# Patient Record
Sex: FEMALE | Race: WHITE | NOT HISPANIC OR LATINO
[De-identification: names, ages, dates, MRNs, and addresses within clinical notes are randomized per-mention and may not be internally consistent; named-entity substitution may affect disease eponyms.]

---

## 2017-01-03 ENCOUNTER — OTHER (OUTPATIENT)
Age: 72
End: 2017-01-03

## 2017-12-07 ENCOUNTER — APPOINTMENT (OUTPATIENT)
Dept: GYNECOLOGIC ONCOLOGY | Facility: CLINIC | Age: 72
End: 2017-12-07

## 2017-12-12 ENCOUNTER — APPOINTMENT (OUTPATIENT)
Dept: GYNECOLOGIC ONCOLOGY | Facility: CLINIC | Age: 72
End: 2017-12-12
Payer: MEDICARE

## 2017-12-12 VITALS
WEIGHT: 155 LBS | HEIGHT: 64 IN | DIASTOLIC BLOOD PRESSURE: 75 MMHG | BODY MASS INDEX: 26.46 KG/M2 | HEART RATE: 62 BPM | SYSTOLIC BLOOD PRESSURE: 114 MMHG

## 2017-12-12 PROCEDURE — 56820 COLPOSCOPY VULVA: CPT

## 2017-12-12 PROCEDURE — 99213 OFFICE O/P EST LOW 20 MIN: CPT | Mod: 25

## 2017-12-13 LAB — HPV HIGH+LOW RISK DNA PNL CVX: NOT DETECTED

## 2017-12-15 LAB — CYTOLOGY CVX/VAG DOC THIN PREP: NORMAL

## 2018-12-18 ENCOUNTER — APPOINTMENT (OUTPATIENT)
Dept: GYNECOLOGIC ONCOLOGY | Facility: CLINIC | Age: 73
End: 2018-12-18

## 2019-05-14 ENCOUNTER — APPOINTMENT (OUTPATIENT)
Dept: GYNECOLOGIC ONCOLOGY | Facility: CLINIC | Age: 74
End: 2019-05-14
Payer: MEDICARE

## 2019-05-14 PROCEDURE — 99213 OFFICE O/P EST LOW 20 MIN: CPT | Mod: 25

## 2019-05-14 PROCEDURE — 56820 COLPOSCOPY VULVA: CPT

## 2019-05-14 RX ORDER — CHROMIUM 200 MCG
TABLET ORAL
Refills: 0 | Status: DISCONTINUED | COMMUNITY
End: 2019-05-14

## 2019-05-14 RX ORDER — IPRATROPIUM BROMIDE AND ALBUTEROL SULFATE 2.5; .5 MG/3ML; MG/3ML
0.5-2.5 (3) SOLUTION RESPIRATORY (INHALATION)
Refills: 0 | Status: ACTIVE | COMMUNITY

## 2019-05-14 RX ORDER — BIOTIN 5 MG
5000 CAPSULE ORAL
Refills: 0 | Status: DISCONTINUED | COMMUNITY
End: 2019-05-14

## 2019-05-16 LAB — HPV HIGH+LOW RISK DNA PNL CVX: NOT DETECTED

## 2019-05-20 LAB — CYTOLOGY CVX/VAG DOC THIN PREP: NORMAL

## 2019-05-21 ENCOUNTER — CHART COPY (OUTPATIENT)
Age: 74
End: 2019-05-21

## 2019-06-06 ENCOUNTER — APPOINTMENT (OUTPATIENT)
Dept: ORTHOPEDIC SURGERY | Facility: CLINIC | Age: 74
End: 2019-06-06

## 2019-07-18 ENCOUNTER — APPOINTMENT (OUTPATIENT)
Dept: ORTHOPEDIC SURGERY | Facility: CLINIC | Age: 74
End: 2019-07-18
Payer: MEDICARE

## 2019-07-18 VITALS — DIASTOLIC BLOOD PRESSURE: 71 MMHG | SYSTOLIC BLOOD PRESSURE: 124 MMHG | HEART RATE: 54 BPM

## 2019-07-18 DIAGNOSIS — Z78.9 OTHER SPECIFIED HEALTH STATUS: ICD-10-CM

## 2019-07-18 DIAGNOSIS — Z60.2 PROBLEMS RELATED TO LIVING ALONE: ICD-10-CM

## 2019-07-18 DIAGNOSIS — M54.5 LOW BACK PAIN: ICD-10-CM

## 2019-07-18 DIAGNOSIS — Z82.61 FAMILY HISTORY OF ARTHRITIS: ICD-10-CM

## 2019-07-18 DIAGNOSIS — Z85.9 PERSONAL HISTORY OF MALIGNANT NEOPLASM, UNSPECIFIED: ICD-10-CM

## 2019-07-18 DIAGNOSIS — M25.551 PAIN IN RIGHT HIP: ICD-10-CM

## 2019-07-18 DIAGNOSIS — Z56.0 UNEMPLOYMENT, UNSPECIFIED: ICD-10-CM

## 2019-07-18 DIAGNOSIS — M47.816 SPONDYLOSIS W/OUT MYELOPATHY OR RADICULOPATHY, LUMBAR REGION: ICD-10-CM

## 2019-07-18 PROCEDURE — 99203 OFFICE O/P NEW LOW 30 MIN: CPT

## 2019-07-18 PROCEDURE — 72170 X-RAY EXAM OF PELVIS: CPT

## 2019-07-18 PROCEDURE — 72100 X-RAY EXAM L-S SPINE 2/3 VWS: CPT

## 2019-07-18 PROCEDURE — 73562 X-RAY EXAM OF KNEE 3: CPT | Mod: 50

## 2019-07-18 SDOH — ECONOMIC STABILITY - INCOME SECURITY: UNEMPLOYMENT, UNSPECIFIED: Z56.0

## 2019-07-18 SDOH — SOCIAL STABILITY - SOCIAL INSECURITY: PROBLEMS RELATED TO LIVING ALONE: Z60.2

## 2019-07-29 PROBLEM — M47.816 DJD (DEGENERATIVE JOINT DISEASE), LUMBAR: Status: ACTIVE | Noted: 2019-07-29

## 2019-07-29 NOTE — DISCUSSION/SUMMARY
[de-identified] : Bilateral Knee advanced degenerative joint disease with Valgus Deformity, Lumbar DJD \par The natural history and treatment of degenerative arthritis was discussed with the patient at length today. The spectrum of treatment including nonoperative modalities to surgical intervention was elucidated. Noninvasive and nonoperative treatment modalities include weight reduction, activity modification with low impact exercise,  as needed use of acetaminophen or anti-inflammatory medications if tolerated, glucosamine/chondroitin supplements, and physical therapy. Further treatments can include corticosteroid injection and the use of viscosupplementation with hyaluronic acid injections. Definitive surgical treatment can certainly include total joint arthroplasty also. The risks and benefits of each treatment options was discussed and all questions were answered.\par The patient was informed of the findings and recommended conservative management in the form of a home exercise program, activity modifications, stationary bicycling, swimming and weight loss program. A trial of Glucosamine and Chondroiten Sulphate was recommended.\par Patient states she does not require refills of her Mobic. \par A prescription for physical therapy was offered but patient opted for home exercise for now. \par Follow-up appointment was recommended 3 months.\par

## 2019-07-29 NOTE — HISTORY OF PRESENT ILLNESS
[2] : a current pain level of 2/10 [7] : an average pain level of 7/10 [de-identified] : Ms. CLAUDIA MEDINA is a 73 year old female, with pmhx of left knee hematoma requiring surgical evacuation 5-6 years ago, as well as left knee medial meniscectomy in 1996, presenting with bilateral knee pain for years. Patient localizes the pain of the bilateral knees to the medial and lateral aspect, and states it is worse with weightbearing activity. She has previously been treated by Dr Michel Gutierrez. Patient takes Mobic, sublingual CBD oil, and homeopathic HGH for pain, all of which work to reduce her symptoms at least temporarily. She had a gel series to the bilateral knees, last injection December 2018 which was not effective. She had a significant amount of swelling as a result of the last shot. Patient also admits to some lower back pain and right hip pain. JTM. \par

## 2019-07-29 NOTE — PHYSICAL EXAM
[Antalgic] : antalgic [LE] : Sensory: Intact in bilateral lower extremities [Knee] : patellar 2+ and symmetric bilaterally [Ankle] : ankle 2+ and symmetric bilaterally [DP] : dorsalis pedis 2+ and symmetric bilaterally [PT] : posterior tibial 2+ and symmetric bilaterally [de-identified] : On general examination the patient is adequately groomed and nourished. The vital parameters are as recorded. \par There is no lymphedema or diffuse swelling, no varicose veins, no skin warmth/erythema/scars/swelling, no ulcers and no palpable lymph nodes or masses in both lower extremities. Bilateral pedal pulses are well palpable.\par Upper Extremity:\par Both right and left upper extremities are unremarkable in terms of skin rash, lesions, pigmentation, redness, tenderness, swelling, joint instability, abnormal deformity or crepitus. The overall range of motion, sensation, motor tone and strength testing are normal.\par \par Bilateral Knee Exam: \par The gait is bilateral stiff knee antalgic.\par Knee alignment:            Right 5 degrees valgus with no flexion deformity. \par Left 5 degrees valgus with no flexion deformity.\par Both knees demonstrate no scars and the skin has no warmth, erythema, swelling or tenderness. \par Both knees have a range of motion of\par Extension:                    Right 0 degrees     Left 0 degrees\par Flexion:                                   Right 90 degrees      Left 95 degrees\par There is bilateral knee medial joint line tenderness. There is bilateral knee mild effusion. \par Claus's test is positive. Elvis test is positive.\par Lachman's test, Anterior/Posterior Drawer test and Pivot Shift Tests are negative. \par There is bilateral knee grade 1 MCL mediolateral laxity and no anteroposterior instability. \par Patella compression test is positive and patellofemoral tracking is normal with no lateral subluxation, apprehension or instability. \par Right knee quadriceps and hamstrings power is 4+.\par Left knee quadriceps and hamstrings power is 4+.\par \par Hip Exam:\par The gait and station is normal\par The patient has equal leg lengths and no pelvic tilt. Tristan/Lynne test is 7 inches on the right and 7 inches on the left. Active SLR is 60 degrees on the right and 60 degrees on the left. Both hips demonstrate no scars and the skin has no signs of inflammation or tenderness. \par Both Hips have a normal range of motion of flexion to 100 degrees, abduction 40 degrees, adduction 20 degrees, external rotation 40 degrees, internal rotation 20 degrees with symmetrical motion in flexion and extension. There is no flexion contracture, deformity or instability. Labral impingement tests are negative.\par Both hips flexor, abductor and extensor power is normal.\par \par Spine Exam:\par There is mild curvature of the spine with loss of normal lumbar lordosis. The skin is devoid of erythema, scars, pigmentation or rashes. There is mild lumbar spasm and tenderness especially at L4-L5 or L5-S1. \par The range of motion of the lumbar spine is limited by pain and spasm. Forward flexion is 80% normal, extension catch positive, lateral flexion and rotation 80% normal. There is no lumbar spine imbalance or instability detected.\par Bilateral passive SLR is right 40 degrees, left 40 degrees in supine and sitting positions. Lasegue's Test is negative.\par Neurology:\par The patient is alert and oriented in person, place and time. The mood is calm and affect is normal.\par Testing for coordination including Rhomberg's Test and Finger-Nose Test, sensation, motor tone and power and deep tendon reflexes in both lower extremities is normal.  [de-identified] : The following radiographs were ordered and read by me during this patients visit. I reviewed each radiograph in detail with the patient and discussed the findings as highlighted below. \par AP, lateral and skyline views of the bilateral knees confirm advanced degenerative joint disease with lateral joint space narrowing and osteophyte formation. Valgus deformity bilaterally. \par AP and false profile views of the right hip and AP view of the pelvis are within normal limits. \par AP and lateral views of the lumbar spine reveal DJD with loss of normal lordosis\par

## 2019-10-02 PROBLEM — Z60.2 PERSON LIVING ALONE: Status: ACTIVE | Noted: 2019-07-18

## 2020-10-06 ENCOUNTER — APPOINTMENT (OUTPATIENT)
Dept: GYNECOLOGIC ONCOLOGY | Facility: CLINIC | Age: 75
End: 2020-10-06
Payer: MEDICARE

## 2020-10-06 VITALS
DIASTOLIC BLOOD PRESSURE: 80 MMHG | BODY MASS INDEX: 22.53 KG/M2 | HEIGHT: 64 IN | WEIGHT: 132 LBS | SYSTOLIC BLOOD PRESSURE: 125 MMHG

## 2020-10-06 PROCEDURE — 56820 COLPOSCOPY VULVA: CPT

## 2020-10-06 PROCEDURE — 99213 OFFICE O/P EST LOW 20 MIN: CPT | Mod: 25

## 2021-06-12 PROBLEM — Z09 FOLLOW UP: Status: ACTIVE | Noted: 2021-06-12

## 2021-06-15 ENCOUNTER — APPOINTMENT (OUTPATIENT)
Dept: GYNECOLOGIC ONCOLOGY | Facility: CLINIC | Age: 76
End: 2021-06-15
Payer: MEDICARE

## 2021-06-15 VITALS
BODY MASS INDEX: 23.05 KG/M2 | HEIGHT: 64 IN | HEART RATE: 51 BPM | DIASTOLIC BLOOD PRESSURE: 70 MMHG | SYSTOLIC BLOOD PRESSURE: 144 MMHG | WEIGHT: 135 LBS

## 2021-06-15 DIAGNOSIS — Z09 ENCOUNTER FOR FOLLOW-UP EXAMINATION AFTER COMPLETED TREATMENT FOR CONDITIONS OTHER THAN MALIGNANT NEOPLASM: ICD-10-CM

## 2021-06-15 DIAGNOSIS — N90.5 ATROPHY OF VULVA: ICD-10-CM

## 2021-06-15 PROCEDURE — 56820 COLPOSCOPY VULVA: CPT

## 2021-06-15 PROCEDURE — 99213 OFFICE O/P EST LOW 20 MIN: CPT | Mod: 25

## 2021-06-15 RX ORDER — IBUPROFEN 200 MG
600 CAPSULE ORAL
Refills: 0 | Status: COMPLETED | COMMUNITY
End: 2021-06-15

## 2021-07-04 ENCOUNTER — TRANSCRIPTION ENCOUNTER (OUTPATIENT)
Age: 76
End: 2021-07-04

## 2021-07-23 ENCOUNTER — TRANSCRIPTION ENCOUNTER (OUTPATIENT)
Age: 76
End: 2021-07-23

## 2021-07-26 ENCOUNTER — TRANSCRIPTION ENCOUNTER (OUTPATIENT)
Age: 76
End: 2021-07-26

## 2021-10-22 ENCOUNTER — TRANSCRIPTION ENCOUNTER (OUTPATIENT)
Age: 76
End: 2021-10-22

## 2022-06-15 ENCOUNTER — APPOINTMENT (OUTPATIENT)
Dept: PAIN MANAGEMENT | Facility: CLINIC | Age: 77
End: 2022-06-15
Payer: MEDICARE

## 2022-06-15 VITALS — WEIGHT: 149 LBS | HEIGHT: 64 IN | BODY MASS INDEX: 25.44 KG/M2

## 2022-06-15 PROCEDURE — 99214 OFFICE O/P EST MOD 30 MIN: CPT

## 2022-06-15 NOTE — ASSESSMENT
[FreeTextEntry1] : Patient is presenting with acute/sub-acute radicular pain with impairment in ADLs and functionality.  The pain has not responded to  conservative care including nsaid therapy and/or physical therapy.  There is no bleeding tendency, unstable medical condition, or systemic infection.\par

## 2022-06-15 NOTE — HISTORY OF PRESENT ILLNESS
[Lower back] : lower back [Dull/Aching] : dull/aching [Radiating] : radiating [Tightness] : tightness [Intermittent] : intermittent [Massage] : massage [Walking] : walking [FreeTextEntry1] : 06/15/2022: follow up today.  pain in low back is returning.  She was in [place] and saw PT.  By April pain improved.  2 weeks ago it shot down left leg.  Goes all the way down into toes.  It is much better today.    \par \par 11/10/21- Patient had 80% relief from injection. Will see patient if needed when she returns from [place] in the spring.\par \par 9/1/21- patient had low back pain 3 months ago on both sides. Went to massage and chiropractor. Pain in right side\par improved and left side did not. Pain shoots down left leg. Had acupuncture. Saw Dr. Gutierrez and given MDP with\par improvement. Has a history of pain in right side but improves with conservative treatment. Had similar pain in the past with relief with massage and chiro. \par \par MRI Lumbar spine (6/29/21): interval progression of multi level degenerative changes of the lumbar pain. moderate to severe stenosis at L4/5, moderate at L3/4 and mild at L2/3. mild to moderate NF stenosis. Moderate FA worst at L4/5 and L5/S1.\par \par \par LESI L5/S1 (10/26/21)\par LESI L4/5 (9/28/21) [] : no [FreeTextEntry8] : random activity  [de-identified] : random activity

## 2022-06-24 ENCOUNTER — NON-APPOINTMENT (OUTPATIENT)
Age: 77
End: 2022-06-24

## 2022-06-27 ENCOUNTER — NON-APPOINTMENT (OUTPATIENT)
Age: 77
End: 2022-06-27

## 2022-07-08 ENCOUNTER — NON-APPOINTMENT (OUTPATIENT)
Age: 77
End: 2022-07-08

## 2022-07-14 ENCOUNTER — APPOINTMENT (OUTPATIENT)
Age: 77
End: 2022-07-14

## 2022-07-14 PROCEDURE — 62323 NJX INTERLAMINAR LMBR/SAC: CPT

## 2022-07-27 ENCOUNTER — APPOINTMENT (OUTPATIENT)
Dept: PAIN MANAGEMENT | Facility: CLINIC | Age: 77
End: 2022-07-27

## 2022-08-08 ENCOUNTER — APPOINTMENT (OUTPATIENT)
Dept: ORTHOPEDIC SURGERY | Facility: CLINIC | Age: 77
End: 2022-08-08

## 2022-08-08 VITALS — BODY MASS INDEX: 25.44 KG/M2 | WEIGHT: 149 LBS | HEIGHT: 64 IN

## 2022-08-08 DIAGNOSIS — M17.12 UNILATERAL PRIMARY OSTEOARTHRITIS, LEFT KNEE: ICD-10-CM

## 2022-08-08 PROCEDURE — 99213 OFFICE O/P EST LOW 20 MIN: CPT

## 2022-08-08 PROCEDURE — 73562 X-RAY EXAM OF KNEE 3: CPT | Mod: LT

## 2022-08-08 RX ORDER — MELOXICAM 15 MG/1
15 TABLET ORAL
Qty: 30 | Refills: 0 | Status: COMPLETED | COMMUNITY
Start: 2022-06-16 | End: 2022-08-08

## 2022-08-08 RX ORDER — FEXOFENADINE HYDROCHLORIDE 60 MG/1
60 TABLET, FILM COATED ORAL
Refills: 0 | Status: COMPLETED | COMMUNITY
End: 2022-08-08

## 2022-08-08 NOTE — HISTORY OF PRESENT ILLNESS
[Gradual] : gradual [0] : 0 [Retired] : Work status: retired [de-identified] : The patient had flareup of left knee pain couple weeks ago.  She says she is better now.  She denies any pain, buckling locking or swelling in her left knee.  Occasional sensation of mild weakness.  Doing home exercises.  Taking meloxicam rarely [] : Post Surgical Visit: no [de-identified] : 12/4/2018 [de-identified] : Dr Elvi Gutierrez

## 2022-08-08 NOTE — IMAGING
[de-identified] : Normal gait\par \par Left knee\par No swelling\par No facet or joint line tenderness\par Passive range of motion 0° to 120°\par Ligaments are stable.  \par Quad strength 5/5\par \par Left leg\par No swelling\par Calf is soft and nontender\par Dorsalis pedis 2+ [Left] : left knee [FreeTextEntry9] : Reviewed and interpreted.  Left knee AP standing, lateral, sunrise views-soft tissue calcifications medial knee.  Severe degenerative changes with narrowing of the lateral compartment on AP standing view, spurring patellofemoral joint

## 2022-08-08 NOTE — DISCUSSION/SUMMARY
[de-identified] : Ice p.r.n.\par The patient is asymptomatic at the present time.  I discussed possible Gel One injection or Zilretta injection if her symptoms recur\par She is going back to [place] in October\par Ice p.r.n.\par Continue meloxicam 15 mg q.d. with food p.r.n.\par Tylenol p.r.n.\par \par Impression:\par Severe lateral compartment osteoarthritis left knee

## 2022-08-23 ENCOUNTER — APPOINTMENT (OUTPATIENT)
Dept: GYNECOLOGIC ONCOLOGY | Facility: CLINIC | Age: 77
End: 2022-08-23

## 2022-08-23 VITALS
HEART RATE: 61 BPM | DIASTOLIC BLOOD PRESSURE: 69 MMHG | SYSTOLIC BLOOD PRESSURE: 110 MMHG | BODY MASS INDEX: 24.41 KG/M2 | WEIGHT: 143 LBS | HEIGHT: 64 IN

## 2022-08-23 PROCEDURE — 99213 OFFICE O/P EST LOW 20 MIN: CPT | Mod: 25

## 2022-08-23 PROCEDURE — 56820 COLPOSCOPY VULVA: CPT

## 2022-10-07 ENCOUNTER — EMERGENCY (EMERGENCY)
Facility: HOSPITAL | Age: 77
LOS: 1 days | Discharge: HOME | End: 2022-10-07
Attending: EMERGENCY MEDICINE | Admitting: EMERGENCY MEDICINE
Payer: MEDICARE

## 2022-10-07 ENCOUNTER — NON-APPOINTMENT (OUTPATIENT)
Age: 77
End: 2022-10-07

## 2022-10-07 VITALS
WEIGHT: 139.99 LBS | TEMPERATURE: 98 F | RESPIRATION RATE: 16 BRPM | OXYGEN SATURATION: 100 % | SYSTOLIC BLOOD PRESSURE: 167 MMHG | HEART RATE: 60 BPM | DIASTOLIC BLOOD PRESSURE: 80 MMHG

## 2022-10-07 PROCEDURE — 99282 EMERGENCY DEPT VISIT SF MDM: CPT

## 2022-10-07 PROCEDURE — 99283 EMERGENCY DEPT VISIT LOW MDM: CPT | Mod: FS,25

## 2022-10-07 PROCEDURE — 12002 RPR S/N/AX/GEN/TRNK2.6-7.5CM: CPT

## 2022-10-07 NOTE — ED PROVIDER NOTE - NS ED ATTENDING STATEMENT MOD
This was a shared visit with the [facility]. I reviewed and verified the documentation and independently performed the documented:

## 2022-10-07 NOTE — ED PROVIDER NOTE - OBJECTIVE STATEMENT
77 F c/o scalp/forehead laceration s/p trip and fall around 11AM today. Hit head on a cabinet. No LOC, no blood thinners. Discomfort to neck. No back pain, epistaxis, numbness, weakness, dizziness, n/v. Ambulating since incident. Tetanus UTD.

## 2022-10-07 NOTE — ED PROVIDER NOTE - CLINICAL SUMMARY MEDICAL DECISION MAKING FREE TEXT BOX
DT: I have personally performed a face to face diagnostic evaluation on this patient.  I have reviewed the PA's note and agree with the history, exam, and plan of care, except as noted.  History and Exam by me shows 77 F c/o scalp/forehead laceration s/p trip and fall around 11AM today. Hit head on a cabinet. No LOC, no blood thinners. Discomfort to neck. No back pain, epistaxis, numbness, weakness, dizziness, n/v. Ambulating since incident. Tetanus UTD. .  Patient is NAD.  A n O x 3. Head NC/scalp lac about 4 cm.  from in all extremities

## 2022-10-07 NOTE — ED PROVIDER NOTE - NSFOLLOWUPINSTRUCTIONS_ED_ALL_ED_FT
Keep wound clean.  Keep wound dry x 24 hours then gently cleanse.  Follow up with concussion clinic.  Return in 1 week for removal.  Return sooner for worsening or concerning symptoms.        Doctors use stitches (sutures), staples, skin glue (tissue adhesive), and skin tape (adhesive strips) to hold your skin together while it heals (wound closure). What your doctor will use depends on your wound. Your doctor also may use more than one way to close your wound.    In most cases, your wound will be closed right away (primary skin closure). Sometimes, it may be closed later so that it can be cleaned and then heal on its own (delayed wound closure).      What are the types of wound closure?    Skin glue   •To use skin glue, your doctor will:  •Hold the edges of the wound together.      •Paint the glue onto your skin. You may need more than one layer.      •Cover your wound with a bandage (dressing) after the glue is dry.      •Skin glue may be used for:  •Small wounds that are not deep (superficial wounds).      •Wounds on the face.      •Children's wounds.      •Skin glue is not used inside of wounds, or on wounds that are:  •Deep.      •Uneven.      •Bleeding.      •Some benefits of skin glue are:  •It leaves nothing that needs to be taken off.      •You do not need medicine to numb the area.      •You have less pain than with other types of closure.        Skin tape     Skin tape is:  •Made of paper that is sticky (adhesive) and has many small holes in it.      •Placed across your wound edges like a normal bandage.      •Used to close very shallow wounds.      •Sometimes used with sutures to help improve closure.      Sutures    Sutures come in many different materials, strengths, and sizes. Some sutures break down as your wound heals (absorbable). Other sutures need to be taken out (nonabsorbable).    To use sutures, your doctor will:  •Sew your skin together with sutures and a needle.      •Use one long stitch or separate stitches.      •Tie and cut the sutures at the end.      Sutures can be used for all types of wounds, including under the skin. They can cause a skin reaction that can lead to infection.    Staples    Staples are often used to close cuts from surgery (incisions). To use staples, your doctor will:  •Hold the edges of your wound close together.      •Place a staple across the wound.      •Use a tool to secure the staple to the skin.      •Repeat this with as many staples as needed.      Staples are faster to use than sutures, and they cause less reaction from your skin. Staples need to be taken out using a tool that bends the staples away from your skin.      Follow these instructions at home:    Medicines     •Take over-the-counter and prescription medicines only as told by your doctor.      •If you were prescribed an antibiotic medicine, take it as told by your doctor. Do not stop taking it even if you start to feel better.        Wound care   Two stitched wounds. One is normal. The other is red with pus and infected.   •Follow instructions from your doctor about how to take care of your wound and bandage.      •Wash your hands with soap and water for at least 20 seconds before and after touching your wound or bandage. If you cannot use soap and water, use hand .    • Do not try to take off or take out your wound closures unless your doctor tells you to do that. You may need a follow-up visit for your doctor to take out your closures.  •Closures may stay in place for 2 weeks or longer.      •Absorbable sutures may break down after a few days or weeks.      •If skin tape edges start to loosen and curl up, you may trim the loose edges.        • Do not pick at your wound. Picking can cause an infection or cause your wound to open up again.      •Apply ointments or creams only as told by your doctor.    •Check your wound every day for signs of infection. Check for:  •Redness, swelling, or pain.      •Fluid or blood.      •New warmth, a rash, or hardness at the wound site.      •Pus or a bad smell.        General instructions     • Do not take baths, swim, or use a hot tub. Ask your doctor about taking showers or sponge baths.      • Do not soak your wound in water.      •Eat foods that include protein, vitamin A, and vitamin C. These nutrients help your wound heal.      •Drink enough fluid to keep your pee (urine) pale yellow.      •Keep all follow-up visits.        Contact a doctor if:    •You have a fever or chills.      •You have redness, swelling, or pain around your wound.      •You have fluid or blood coming from your wound.      •You have new warmth, a rash, or hardness around your wound.      •You see that your wound becomes thick, raised, and darker in color after your sutures come out (scarring).        Get help right away if:    •The edges of your wound start to separate.      •Your wound opens up again.      •You notice pus or a bad smell coming from your wound.        Summary    •What your doctor uses to hold your skin together while it heals (wound closure) depends on your wound.      •Your doctor may use stitches (sutures), staples, skin glue (tissue adhesive), or skin tape (adhesive strips).      • Do not try to take off or take out your wound closures unless your doctor tells you to do that.      • Do not soak your wound in water.      This information is not intended to replace advice given to you by your health care provider. Make sure you discuss any questions you have with your health care provider.

## 2022-10-07 NOTE — ED PROVIDER NOTE - PATIENT PORTAL LINK FT
[Mother] : mother You can access the FollowMyHealth Patient Portal offered by  by registering at the following website: [email or web address]. By joining [facility]’s FollowMyHealth portal, you will also be able to view your health information using other applications (apps) compatible with our system.

## 2022-10-07 NOTE — ED PROVIDER NOTE - ENMT, MLM
Laceration to top of scalp by hair line, approx 4cm, no active bleeding. Ecchymosis to forehead. No evidence of nasal/oral injury. No deformity.

## 2023-06-22 ENCOUNTER — APPOINTMENT (OUTPATIENT)
Dept: ORTHOPEDIC SURGERY | Facility: CLINIC | Age: 78
End: 2023-06-22
Payer: MEDICARE

## 2023-06-22 VITALS — HEIGHT: 64 IN | WEIGHT: 143 LBS | BODY MASS INDEX: 24.41 KG/M2

## 2023-06-22 DIAGNOSIS — M17.0 BILATERAL PRIMARY OSTEOARTHRITIS OF KNEE: ICD-10-CM

## 2023-06-22 PROCEDURE — 99214 OFFICE O/P EST MOD 30 MIN: CPT

## 2023-06-22 PROCEDURE — 73562 X-RAY EXAM OF KNEE 3: CPT | Mod: RT

## 2023-06-22 NOTE — DISCUSSION/SUMMARY
[de-identified] : Ice p.r.n.\par I discussed TriVisc injections and gave her a pamphlet.  Risk benefits and the alternatives were discussed.  She would like to do this\par Continue home exercises\par She is not interested in knee replacements\par Ice p.r.n.\par Continue meloxicam 15 mg q.d. with food p.r.n.\par Tylenol p.r.n.\par \par Impression:\par Severe lateral compartment osteoarthritis left knee\par Severe lateral compartment osteoarthritis right knee

## 2023-06-22 NOTE — HISTORY OF PRESENT ILLNESS
[Gradual] : gradual [2] : 2 [Dull/Aching] : dull/aching [Intermittent] : intermittent [Leisure] : leisure [Rest] : rest [Exercising] : exercising [Retired] : Work status: retired [de-identified] : The patient has chronic bilateral knee pain.  She had Orthovisc injections in [place] which she finished December 19 without improvement.  She has had Gelsyn injections in the past and had reaction.  She had Supartz injections with some relief.  Hyalgan injections without relief.  The left knee bothers her more than the right.  She has mild pain standing and walking.  Pain with stairs and movie sign.  Taking meloxicam prn [] : Post Surgical Visit: no [de-identified] : 12/19/2022

## 2023-06-22 NOTE — IMAGING
[Bilateral] : knee bilaterally [de-identified] : Normal gait\par \par Left knee\par No swelling.  Healed scar medial knee\par Mild lateral facet and joint line tenderness\par Passive range of motion 0° to 120°\par Ligaments are stable.  \par Quad strength 5/5\par \par Right knee\par No swelling\par Mild lateral facet and joint line tenderness\par Passive range of motion 0 degrees to 120 degrees\par Ligaments are stable\par Quad strength 5/5\par \par Both legs\par No swelling\par Calves are soft and nontender\par Posterior tibial pulse 2+ bilaterally [FreeTextEntry9] : Reviewed and interpreted.  Right knee AP standing, lateral, sunrise views-severe degenerative changes with narrowing of the lateral compartment on AP standing view, spurring patellofemoral joint.  Mild soft tissue calcification lateral knee\par \par Reviewed and interpreted.  Left knee AP standing, lateral, sunrise views-severe degenerative changes with narrowing of the lateral compartment on AP standing view, spurring patellofemoral joint.  Mild to moderate calcification soft tissues medial knee

## 2023-08-22 ENCOUNTER — APPOINTMENT (OUTPATIENT)
Dept: GYNECOLOGIC ONCOLOGY | Facility: CLINIC | Age: 78
End: 2023-08-22
Payer: MEDICARE

## 2023-08-22 VITALS
HEART RATE: 51 BPM | DIASTOLIC BLOOD PRESSURE: 72 MMHG | HEIGHT: 64 IN | SYSTOLIC BLOOD PRESSURE: 167 MMHG | BODY MASS INDEX: 25.78 KG/M2 | WEIGHT: 151 LBS

## 2023-08-22 PROCEDURE — 99213 OFFICE O/P EST LOW 20 MIN: CPT | Mod: 25

## 2023-08-22 PROCEDURE — 56820 COLPOSCOPY VULVA: CPT

## 2023-08-29 ENCOUNTER — APPOINTMENT (OUTPATIENT)
Dept: ORTHOPEDIC SURGERY | Facility: CLINIC | Age: 78
End: 2023-08-29

## 2023-09-05 ENCOUNTER — APPOINTMENT (OUTPATIENT)
Dept: ORTHOPEDIC SURGERY | Facility: CLINIC | Age: 78
End: 2023-09-05

## 2023-09-09 NOTE — DISCUSSION/SUMMARY
[FreeTextEntry1] :  Risk, signs and symptoms of recurrent dysplasia were reviewed.  HM reviewed.  All questions answered.  She was advised to see Dr. Becerra and AMELIA on an alternating 6month interval for continued surveillance.

## 2023-09-09 NOTE — HISTORY OF PRESENT ILLNESS
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 She reports that she is overall doing well. She continues with use of external estrace cream as needed, prescribed by Dr. Becerra for atrophy.  She has occasional vaginal dryness otherwise denies any recent vulvar complaints or any other associated signs or symptoms. She has ongoing urinary incontinence issues that she states has improved greatly after acupuncture.   HM: PAP: 9/2022 by Dr. Becerra negative, no HPV performed (h/o +HRHPV 2009)  Mammo- breast health by Dr. Becerra Colonoscopy- 4 years ago per patient BMD- 6/2014 osteopenia  Referring GYN: Dr. Becerra

## 2023-09-09 NOTE — PHYSICAL EXAM
[Absent] : CVAT: absent [Normal] : Anus and perineum: Normal sphincter tone, no masses, no prolapse. [Fully active, able to carry on all pre-disease performance without restriction] : Status 0 - Fully active, able to carry on all pre-disease performance without restriction [de-identified] : no gross lesions [de-identified] : The adnexae were nonpalpable [de-identified] : no perianal lesions

## 2023-09-09 NOTE — PROCEDURE
[Colposcopy] : colposcopy [Patient] : the patient [Verbal Consent] : verbal consent was obtained prior to the procedure and is detailed in the patient's record [Site Verification] : site verification performed. [Time Out] : Time Out Procedure:The following was confirmed prior to the procedure-Correct patient identity, correct site, agreement on the procedure to be done and correct patient position. [No Abnormalities] : no abnormalities seen [No Complications] : none [Tolerated Well] : the patient tolerated the procedure well [Post procedure instructions and information given] : post procedure instructions and information given and reviewed with patient. [0] : 0 [FreeTextEntry9] : h/o CLIF [de-identified] :  Well-healed scar and no evidence of dysplasia.

## 2023-09-09 NOTE — LETTER BODY
[Dear  ___] : Dear  [unfilled], [I recently saw our patient [unfilled] for a follow-up visit.] : I recently saw our patient, [unfilled] for a follow-up visit. [Attached please find my note.] : Attached please find my note. [FreeTextEntry2] : Vulvar colposcopy was performed. She is doing very well and is clinically without evidence of dysplasia. She will return to see you and I on an alternating q6m schedule. Thank you again for referring this kate patient.

## 2023-09-12 ENCOUNTER — APPOINTMENT (OUTPATIENT)
Dept: ORTHOPEDIC SURGERY | Facility: CLINIC | Age: 78
End: 2023-09-12

## 2023-09-18 ENCOUNTER — RX RENEWAL (OUTPATIENT)
Age: 78
End: 2023-09-18

## 2023-09-18 RX ORDER — MELOXICAM 15 MG/1
15 TABLET ORAL
Qty: 30 | Refills: 1 | Status: ACTIVE | COMMUNITY
Start: 2022-08-08 | End: 1900-01-01

## 2024-05-25 RX ORDER — FAMOTIDINE 10 MG/1
TABLET, FILM COATED ORAL
Refills: 0 | Status: ACTIVE | COMMUNITY

## 2024-05-28 ENCOUNTER — APPOINTMENT (OUTPATIENT)
Dept: GYNECOLOGIC ONCOLOGY | Facility: CLINIC | Age: 79
End: 2024-05-28

## 2024-05-28 VITALS
HEIGHT: 64 IN | WEIGHT: 152 LBS | SYSTOLIC BLOOD PRESSURE: 144 MMHG | BODY MASS INDEX: 25.95 KG/M2 | HEART RATE: 56 BPM | DIASTOLIC BLOOD PRESSURE: 77 MMHG

## 2024-05-28 DIAGNOSIS — D07.1 CARCINOMA IN SITU OF VULVA: ICD-10-CM

## 2024-05-28 DIAGNOSIS — B97.7 PAPILLOMAVIRUS AS THE CAUSE OF DISEASES CLASSIFIED ELSEWHERE: ICD-10-CM

## 2024-05-28 PROCEDURE — 99459 PELVIC EXAMINATION: CPT

## 2024-05-28 PROCEDURE — 99213 OFFICE O/P EST LOW 20 MIN: CPT | Mod: 25

## 2024-05-28 PROCEDURE — 56820 COLPOSCOPY VULVA: CPT

## 2024-05-28 NOTE — PHYSICAL EXAM
[Absent] : CVAT: absent [Normal] : Anus and perineum: Normal sphincter tone, no masses, no prolapse. [Fully active, able to carry on all pre-disease performance without restriction] : Status 0 - Fully active, able to carry on all pre-disease performance without restriction [Chaperone Present] : A chaperone was present in the examining room during all aspects of the physical examination [[id number]] : A chaperone was present during the pelvic exam. [FreeTextEntry2] : Terri [de-identified] : no gross lesions [de-identified] : The adnexae were nonpalpable [de-identified] : no perianal lesions

## 2024-05-28 NOTE — PROCEDURE
[Colposcopy] : colposcopy [Patient] : the patient [Verbal Consent] : verbal consent was obtained prior to the procedure and is detailed in the patient's record [Site Verification] : site verification performed. [Time Out] : Time Out Procedure:The following was confirmed prior to the procedure-Correct patient identity, correct site, agreement on the procedure to be done and correct patient position. [No Abnormalities] : no abnormalities seen [No Complications] : none [Tolerated Well] : the patient tolerated the procedure well [Post procedure instructions and information given] : post procedure instructions and information given and reviewed with patient. [0] : 0 [FreeTextEntry9] : h/o CLIF [de-identified] :  Well-healed scar and no evidence of dysplasia.

## 2024-05-28 NOTE — HISTORY OF PRESENT ILLNESS
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 She reports that she is overall doing well. She continues with use of external estrace cream as needed, prescribed by Dr. Becerra for atrophy.  She has occasional vaginal dryness otherwise denies any recent vulvar complaints or any other associated signs or symptoms. She has ongoing urinary incontinence issues that she states has improved greatly after acupuncture.  She  in 2023, lives in [place] 8 months out of year (returns for summers in [place]).  HM: PAP: followed by Dr. Becerra (h/o +HRHPV 2009)  Mammo- breast health by Dr. Becerra Colonoscopy- 4 years ago per patient BMD- 6/2014 osteopenia  Referring GYN: Dr. Becerra

## 2024-07-29 ENCOUNTER — APPOINTMENT (OUTPATIENT)
Dept: PAIN MANAGEMENT | Facility: CLINIC | Age: 79
End: 2024-07-29
Payer: MEDICARE

## 2024-07-29 VITALS — BODY MASS INDEX: 27.64 KG/M2 | HEIGHT: 63 IN | WEIGHT: 156 LBS

## 2024-07-29 PROCEDURE — 99214 OFFICE O/P EST MOD 30 MIN: CPT

## 2024-07-29 NOTE — HISTORY OF PRESENT ILLNESS
[Lower back] : lower back [Intermittent] : intermittent [Massage] : massage [Walking] : walking [8] : 8 [Burning] : burning [FreeTextEntry1] : 07/29/2024: follow up today.  Had an epidural May 9 in [place] with relief of leg pain.  Has a burning in buttock on right.  Has some ache down front of shin from knee down L>R.  Was having burning down legs to toes and improved after epidural.   Does pilates.   Multilevel disc degeneration severe at L4/5 with subtle modic 1 endplate change, L3/4 moderate severe stenosis impingement of L3 nerve.  L4/5 severe stenosis impingement of L4 nerve L5-S1 moderate severe neural foraminal stenosis on the right impinges the right L5 nerve root.  MRI was done at [facility] in [place].  Has mast cell syndrome.   Takes Mobic.  has worsening of bladder and bowel control.    06/15/2022: follow up today.  pain in low back is returning.  She was in [place] and saw PT.  By April pain improved.  2 weeks ago it shot down left leg.  Goes all the way down into toes.  It is much better today.      11/10/21- Patient had 80% relief from injection. Will see patient if needed when she returns from [place] in the spring.  9/1/21- patient had low back pain 3 months ago on both sides. Went to massage and chiropractor. Pain in right side improved and left side did not. Pain shoots down left leg. Had acupuncture. Saw Dr. Gutierrez and given MDP with improvement. Has a history of pain in right side but improves with conservative treatment. Had similar pain in the past with relief with massage and chiro.   MRI Lumbar spine (6/29/21): interval progression of multi level degenerative changes of the lumbar pain. moderate to severe stenosis at L4/5, moderate at L3/4 and mild at L2/3. mild to moderate NF stenosis. Moderate FA worst at L4/5 and L5/S1.   LESI L5/S1 (10/26/21) LESI L4/5 (9/28/21) [] : no [FreeTextEntry8] : random activity  [de-identified] : random activity, getting up from a sitting position

## 2024-07-29 NOTE — HISTORY OF PRESENT ILLNESS
[Lower back] : lower back [Intermittent] : intermittent [Massage] : massage [Walking] : walking [8] : 8 [Burning] : burning [FreeTextEntry1] : 07/29/2024: follow up today.  Had an epidural May 9 in [place] with relief of leg pain.  Has a burning in buttock on right.  Has some ache down front of shin from knee down L>R.  Was having burning down legs to toes and improved after epidural.   Does pilates.   Multilevel disc degeneration severe at L4/5 with subtle modic 1 endplate change, L3/4 moderate severe stenosis impingement of L3 nerve.  L4/5 severe stenosis impingement of L4 nerve L5-S1 moderate severe neural foraminal stenosis on the right impinges the right L5 nerve root.  MRI was done at [facility] in [place].  Has mast cell syndrome.   Takes Mobic.  has worsening of bladder and bowel control.    06/15/2022: follow up today.  pain in low back is returning.  She was in [place] and saw PT.  By April pain improved.  2 weeks ago it shot down left leg.  Goes all the way down into toes.  It is much better today.      11/10/21- Patient had 80% relief from injection. Will see patient if needed when she returns from [place] in the spring.  9/1/21- patient had low back pain 3 months ago on both sides. Went to massage and chiropractor. Pain in right side improved and left side did not. Pain shoots down left leg. Had acupuncture. Saw Dr. Gutierrez and given MDP with improvement. Has a history of pain in right side but improves with conservative treatment. Had similar pain in the past with relief with massage and chiro.   MRI Lumbar spine (6/29/21): interval progression of multi level degenerative changes of the lumbar pain. moderate to severe stenosis at L4/5, moderate at L3/4 and mild at L2/3. mild to moderate NF stenosis. Moderate FA worst at L4/5 and L5/S1.   LESI L5/S1 (10/26/21) LESI L4/5 (9/28/21) [] : no [FreeTextEntry8] : random activity  [de-identified] : random activity, getting up from a sitting position

## 2024-07-29 NOTE — ASSESSMENT
[FreeTextEntry1] : After discussing various treatment options with the patient including but not limited to oral medications, physical therapy, exercise, modalities as well as interventional spinal injections, we have decided with the following plan:   1) Intervention Injection Therapy: I personally reviewed the MRI/CT scan images and agree with the radiologist's report. The radiological findings were discussed with the patient. The risks, benefits, contents and alternatives to injection were explained in full to the patient. Risks outlined include but are not limited to infection, sepsis, bleeding, post-dural puncture headache, nerve damage, temporary increase in pain, syncopal episode, failure to resolve symptoms, allergic reaction, symptom recurrence, and elevation of blood sugar in diabetics. Cortisone may cause immunosuppression. Patient understands the risks. All questions were answered. After discussion of options, patient requested an injection. Information regarding the injection was given to the patient. Which medications to stop prior to the injection was explained to the patient as well.   Follow up in 1-2 weeks post injection for re-evaluation. Continue Home exercises, stretching, activity modification, physical therapy, and conservative care.   Patient is presenting with acute/sub-acute radicular pain with impairment in ADLs and functionality.  The pain has not responded sufficiently to  conservative care including nsaid therapy and/or physical therapy.  There is no bleeding tendency, unstable medical condition, or systemic infection. The purpose of the spinal injections is to facilitate active therapy by providing short term relief through reduction of pain and inflammation.   Injections, by themselves, are not likely to provide long-term relief. Rather, active rehabilitation with modified work achieves long-term relief by increasing active ROM, strength and stability.   b/l L4/5 TFESI

## 2024-08-05 ENCOUNTER — APPOINTMENT (OUTPATIENT)
Dept: PAIN MANAGEMENT | Facility: CLINIC | Age: 79
End: 2024-08-05

## 2024-08-08 ENCOUNTER — APPOINTMENT (OUTPATIENT)
Dept: PAIN MANAGEMENT | Facility: CLINIC | Age: 79
End: 2024-08-08

## 2024-08-08 PROBLEM — M54.16 LUMBAR RADICULOPATHY: Status: ACTIVE | Noted: 2024-07-29

## 2024-08-08 PROBLEM — M54.50 LUMBAGO: Status: ACTIVE | Noted: 2019-07-18

## 2024-08-08 PROCEDURE — 64483 NJX AA&/STRD TFRM EPI L/S 1: CPT | Mod: 50

## 2024-08-08 NOTE — PROCEDURE
[FreeTextEntry3] : Date of Service: 08/08/2024   Account: [id number]   Patient: CLAUDIA RENTERIA   YOB: 1945   Age: 78 year     Surgeon:                               Gabriela Wood M.D.   Assistant:                                 None.   Pre-Operative Diagnosis:     Lumbosacral Radiculitis (M54.17)                  Post Operative Diagnosis:    Lumbosacral Radiculitis (M54.17)                  Procedure:                              Right L4-5 transforaminal epidural steroid injection                                                     Left L4-5 transforaminal epidural steroid injection under fluoroscopic guidance.   Anesthesia:                             None     This procedure was carried out using fluoroscopic guidance.  The risks and benefits of the procedure were discussed extensively with the patient.  The consent of the patient was obtained and the following procedure was performed. The patient was placed in the prone position on the fluoroscopic table and the lumbar area was prepped and draped in a sterile fashion.   The left L4-5 neural foramen was then identified on left oblique  "jose dog" anatomical view at the 6 o' clock position using fluoroscopic guidance, and the area was marked. The overlying skin and subcutaneous structures were anesthetized using sterile technique with 1% Lidocaine.  A 22 gauge spinal needle was directed toward the inferior (6 o'clock) position of the pedicle, which formed the roof of the identified foramen.  Once in the epidural space, after negative aspiration for heme and CSF, 1cc of Omnipaque contrast was injected to confirm epidural location and assess filling defects and rule out intravascular needle placement.   The following contrast flow observed: no intravascular or intrathecal flow pattern was noted.  No blood or CSF was aspirated. Omnipaque spread medially in epidural space.   After this, an injectate of 3 cc preservative free normal saline plus 40 mg of Kenalog was injected in the epidural space.   The right L4-5 neural foramen was then identified on right oblique "jose dog" anatomical view at the 6 o' clock position using fluoroscopic guidance, and the area was marked. The overlying skin and subcutaneous structures were anesthetized using sterile technique with 1% Lidocaine.  A 22 gauge spinal needle was directed toward the inferior (6 o'clock) position of the pedicle, which formed the roof of the identified foramen.  Once in the epidural space, after negative aspiration for heme and CSF, 1cc of Omnipaque contrast was injected to confirm epidural location and assess filling defects and rule out intravascular needle placement.   The following contrast flow observed: no intravascular or intrathecal flow pattern was noted.  No blood or CSF was aspirated. Omnipaque spread medially in epidural space.   After this, an injectate of 3 cc preservative free normal saline plus 40 mg of Kenalog was injected in the epidural space.   The needle was subsequently removed.  Vital signs remained normal.  Pulse oximeter was used throughout the procedure and the patient's pulse and oxygen saturation remained within normal limits.  The patient tolerated the procedure well.  There were no complications.  The patient was instructed to apply ice over the injection sites for twenty minutes every two hours for the next 24 to 48 hours.  The patient was also instructed to contact me immediately if there were any problems.   Gabriela Wood M.D.

## 2024-08-26 ENCOUNTER — APPOINTMENT (OUTPATIENT)
Dept: PAIN MANAGEMENT | Facility: CLINIC | Age: 79
End: 2024-08-26
Payer: MEDICARE

## 2024-08-26 VITALS — WEIGHT: 154 LBS | HEIGHT: 63 IN | BODY MASS INDEX: 27.29 KG/M2

## 2024-08-26 DIAGNOSIS — M54.16 RADICULOPATHY, LUMBAR REGION: ICD-10-CM

## 2024-08-26 PROCEDURE — 99214 OFFICE O/P EST MOD 30 MIN: CPT

## 2024-08-26 NOTE — ASSESSMENT
[FreeTextEntry1] : After discussing various treatment options with the patient including but not limited to oral medications, physical therapy, exercise, modalities as well as interventional spinal injections, we have decided with the following plan:   1) Intervention Injection Therapy: I personally reviewed the MRI/CT scan images and agree with the radiologist's report. The radiological findings were discussed with the patient. The risks, benefits, contents and alternatives to injection were explained in full to the patient. Risks outlined include but are not limited to infection, sepsis, bleeding, post-dural puncture headache, nerve damage, temporary increase in pain, syncopal episode, failure to resolve symptoms, allergic reaction, symptom recurrence, and elevation of blood sugar in diabetics. Cortisone may cause immunosuppression. Patient understands the risks. All questions were answered. After discussion of options, patient requested an injection. Information regarding the injection was given to the patient. Which medications to stop prior to the injection was explained to the patient as well.   Follow up in 1-2 weeks post injection for re-evaluation. Continue Home exercises, stretching, activity modification, physical therapy, and conservative care.   Patient is presenting with acute/sub-acute radicular pain with impairment in ADLs and functionality.  The pain has not responded sufficiently to  conservative care including nsaid therapy and/or physical therapy.  There is no bleeding tendency, unstable medical condition, or systemic infection. The purpose of the spinal injections is to facilitate active therapy by providing short term relief through reduction of pain and inflammation.   Injections, by themselves, are not likely to provide long-term relief. Rather, active rehabilitation with modified work achieves long-term relief by increasing active ROM, strength and stability.   b/l L4/5 TFESI -->will call

## 2024-08-26 NOTE — HISTORY OF PRESENT ILLNESS
[Lower back] : lower back [8] : 8 [Burning] : burning [Intermittent] : intermittent [Massage] : massage [Walking] : walking [FreeTextEntry1] : 8/26/24- pt is here today for follow up to post procedure B/L L4-L5 TFESI on 08/08/24 with 70% relief. Pain better when she gets outs of bed. She has less stiffness.  Pain mainly in the right lower back. The leg pain is better.  She is going back to [place] at the end of September.    07/29/2024: follow up today.  Had an epidural May 9 in [place] with relief of leg pain.  Has a burning in buttock on right.  Has some ache down front of shin from knee down L>R.  Was having burning down legs to toes and improved after epidural.   Does pilates.   Multilevel disc degeneration severe at L4/5 with subtle modic 1 endplate change, L3/4 moderate severe stenosis impingement of L3 nerve.  L4/5 severe stenosis impingement of L4 nerve L5-S1 moderate severe neural foraminal stenosis on the right impinges the right L5 nerve root.  MRI was done at [facility] in [place].  Has mast cell syndrome.   Takes Mobic.  has worsening of bladder and bowel control.    06/15/2022: follow up today.  pain in low back is returning.  She was in [place] and saw PT.  By April pain improved.  2 weeks ago it shot down left leg.  Goes all the way down into toes.  It is much better today.      11/10/21- Patient had 80% relief from injection. Will see patient if needed when she returns from [place] in the spring.  9/1/21- patient had low back pain 3 months ago on both sides. Went to massage and chiropractor. Pain in right side improved and left side did not. Pain shoots down left leg. Had acupuncture. Saw Dr. Gutierrez and given MDP with improvement. Has a history of pain in right side but improves with conservative treatment. Had similar pain in the past with relief with massage and chiro.   MRI Lumbar spine (6/29/21): interval progression of multi level degenerative changes of the lumbar pain. moderate to severe stenosis at L4/5, moderate at L3/4 and mild at L2/3. mild to moderate NF stenosis. Moderate FA worst at L4/5 and L5/S1.   LESI L5/S1 (10/26/21) LESI L4/5 (9/28/21) [] : no [FreeTextEntry8] : random activity  [de-identified] : random activity, getting up from a sitting position

## 2024-08-26 NOTE — HISTORY OF PRESENT ILLNESS
[Lower back] : lower back [8] : 8 [Burning] : burning [Intermittent] : intermittent [Massage] : massage [Walking] : walking [FreeTextEntry1] : 8/26/24- pt is here today for follow up to post procedure B/L L4-L5 TFESI on 08/08/24 with 70% relief. Pain better when she gets outs of bed. She has less stiffness.  Pain mainly in the right lower back. The leg pain is better.  She is going back to [place] at the end of September.    07/29/2024: follow up today.  Had an epidural May 9 in [place] with relief of leg pain.  Has a burning in buttock on right.  Has some ache down front of shin from knee down L>R.  Was having burning down legs to toes and improved after epidural.   Does pilates.   Multilevel disc degeneration severe at L4/5 with subtle modic 1 endplate change, L3/4 moderate severe stenosis impingement of L3 nerve.  L4/5 severe stenosis impingement of L4 nerve L5-S1 moderate severe neural foraminal stenosis on the right impinges the right L5 nerve root.  MRI was done at [facility] in [place].  Has mast cell syndrome.   Takes Mobic.  has worsening of bladder and bowel control.    06/15/2022: follow up today.  pain in low back is returning.  She was in [place] and saw PT.  By April pain improved.  2 weeks ago it shot down left leg.  Goes all the way down into toes.  It is much better today.      11/10/21- Patient had 80% relief from injection. Will see patient if needed when she returns from [place] in the spring.  9/1/21- patient had low back pain 3 months ago on both sides. Went to massage and chiropractor. Pain in right side improved and left side did not. Pain shoots down left leg. Had acupuncture. Saw Dr. Gutierrez and given MDP with improvement. Has a history of pain in right side but improves with conservative treatment. Had similar pain in the past with relief with massage and chiro.   MRI Lumbar spine (6/29/21): interval progression of multi level degenerative changes of the lumbar pain. moderate to severe stenosis at L4/5, moderate at L3/4 and mild at L2/3. mild to moderate NF stenosis. Moderate FA worst at L4/5 and L5/S1.   LESI L5/S1 (10/26/21) LESI L4/5 (9/28/21) [] : no [FreeTextEntry8] : random activity  [de-identified] : random activity, getting up from a sitting position

## 2024-09-18 NOTE — ED PROVIDER NOTE - DISPOSITION TYPE
-- DO NOT REPLY / DO NOT REPLY ALL --  -- This inbox is not monitored. If this was sent to the wrong provider or department, reroute message to [facility] pool. --  -- Message is from [facility] [employer] ([facility]) --    General Patient Message:       Pt would like a call back as soon as possible please, miss call from Dr office regarding test results, there is no encounter for this. Try secure chat, no answer. Thank you.        Caller Information       Contact Date/Time Type Contact Phone/Fax    09/18/2024 02:37 PM CDT Phone (Incoming) Lin Shipman (Self) [phone] (M)            Alternative phone number: Iv[facility] [phone]     Can a detailed message be left? Yes - [employer] Message   Ok to Voicemail.  Patient has been advised the message will be addressed within 2-3 business days.                
DISCHARGE

## 2025-06-30 ENCOUNTER — APPOINTMENT (OUTPATIENT)
Dept: ORTHOPEDIC SURGERY | Facility: CLINIC | Age: 80
End: 2025-06-30
Payer: MEDICARE

## 2025-06-30 VITALS — HEIGHT: 63 IN | BODY MASS INDEX: 27.29 KG/M2 | WEIGHT: 154 LBS

## 2025-06-30 PROCEDURE — 73562 X-RAY EXAM OF KNEE 3: CPT | Mod: 50

## 2025-06-30 PROCEDURE — 99214 OFFICE O/P EST MOD 30 MIN: CPT

## 2025-07-02 ENCOUNTER — TRANSCRIPTION ENCOUNTER (OUTPATIENT)
Age: 80
End: 2025-07-02

## 2025-07-15 ENCOUNTER — APPOINTMENT (OUTPATIENT)
Dept: GYNECOLOGIC ONCOLOGY | Facility: CLINIC | Age: 80
End: 2025-07-15
Payer: MEDICARE

## 2025-07-15 ENCOUNTER — NON-APPOINTMENT (OUTPATIENT)
Age: 80
End: 2025-07-15

## 2025-07-15 VITALS
OXYGEN SATURATION: 98 % | WEIGHT: 156 LBS | HEIGHT: 63 IN | HEART RATE: 50 BPM | BODY MASS INDEX: 27.64 KG/M2 | DIASTOLIC BLOOD PRESSURE: 79 MMHG | SYSTOLIC BLOOD PRESSURE: 133 MMHG

## 2025-07-15 PROCEDURE — 99213 OFFICE O/P EST LOW 20 MIN: CPT | Mod: 25

## 2025-07-15 PROCEDURE — 99459 PELVIC EXAMINATION: CPT

## 2025-07-15 PROCEDURE — 56820 COLPOSCOPY VULVA: CPT

## 2025-08-07 ENCOUNTER — APPOINTMENT (OUTPATIENT)
Dept: ORTHOPEDIC SURGERY | Facility: CLINIC | Age: 80
End: 2025-08-07

## 2025-08-07 VITALS — BODY MASS INDEX: 27.64 KG/M2 | WEIGHT: 156 LBS | HEIGHT: 63 IN

## 2025-08-07 DIAGNOSIS — M17.11 UNILATERAL PRIMARY OSTEOARTHRITIS, RIGHT KNEE: ICD-10-CM

## 2025-08-07 RX ORDER — HYALURONATE SOD, CROSS-LINKED 30 MG/3 ML
30 SYRINGE (ML) INTRAARTICULAR
Refills: 0 | Status: COMPLETED | OUTPATIENT
Start: 2025-08-07

## 2025-08-07 RX ADMIN — Medication MG/3ML: at 00:00
